# Patient Record
Sex: MALE | Race: OTHER | Employment: OTHER | URBAN - METROPOLITAN AREA
[De-identification: names, ages, dates, MRNs, and addresses within clinical notes are randomized per-mention and may not be internally consistent; named-entity substitution may affect disease eponyms.]

---

## 2022-01-14 ENCOUNTER — NEW PATIENT (OUTPATIENT)
Dept: URBAN - METROPOLITAN AREA CLINIC 52 | Facility: CLINIC | Age: 57
End: 2022-01-14

## 2022-01-14 DIAGNOSIS — H25.13: ICD-10-CM

## 2022-01-14 DIAGNOSIS — Z79.4: ICD-10-CM

## 2022-01-14 DIAGNOSIS — E11.9: ICD-10-CM

## 2022-01-14 DIAGNOSIS — H35.373: ICD-10-CM

## 2022-01-14 PROCEDURE — 92004 COMPRE OPH EXAM NEW PT 1/>: CPT

## 2022-01-14 PROCEDURE — 92015 DETERMINE REFRACTIVE STATE: CPT

## 2022-01-14 PROCEDURE — 92134 CPTRZ OPH DX IMG PST SGM RTA: CPT

## 2022-01-14 ASSESSMENT — VISUAL ACUITY
OS_GLARE: 20/25
OU_SC: 20/60
OS_SC: 20/60
OS_GLARE: 20/25
OD_GLARE: 20/40
OD_CC: J1 @ 14IN
OD_GLARE: 20/40
OD_PH: 20/30
OS_CC: J1+ @ 14IN
OD_SC: 20/60
OU_CC: J1+ @ 14IN
OS_PH: 20/25

## 2022-01-14 ASSESSMENT — TONOMETRY
OD_IOP_MMHG: 13
OS_IOP_MMHG: 14

## 2022-01-14 NOTE — PATIENT DISCUSSION
Recommend patient getting Blood sugar under control (LBS <250)for at least 1 month prior to giving patient glasses rx. (Okay to give glasses rx if needed).

## 2024-07-30 ENCOUNTER — PREPPED CHART (OUTPATIENT)
Dept: URBAN - METROPOLITAN AREA CLINIC 52 | Facility: CLINIC | Age: 59
End: 2024-07-30